# Patient Record
Sex: FEMALE | Race: OTHER | URBAN - METROPOLITAN AREA
[De-identification: names, ages, dates, MRNs, and addresses within clinical notes are randomized per-mention and may not be internally consistent; named-entity substitution may affect disease eponyms.]

---

## 2020-01-28 ENCOUNTER — IMPORTED ENCOUNTER (OUTPATIENT)
Dept: URBAN - METROPOLITAN AREA CLINIC 50 | Facility: CLINIC | Age: 70
End: 2020-01-28

## 2020-06-01 ENCOUNTER — IMPORTED ENCOUNTER (OUTPATIENT)
Dept: URBAN - METROPOLITAN AREA CLINIC 50 | Facility: CLINIC | Age: 70
End: 2020-06-01

## 2020-06-04 ENCOUNTER — IMPORTED ENCOUNTER (OUTPATIENT)
Dept: URBAN - METROPOLITAN AREA CLINIC 50 | Facility: CLINIC | Age: 70
End: 2020-06-04

## 2020-08-18 ENCOUNTER — IMPORTED ENCOUNTER (OUTPATIENT)
Dept: URBAN - METROPOLITAN AREA CLINIC 50 | Facility: CLINIC | Age: 70
End: 2020-08-18

## 2020-09-11 ENCOUNTER — IMPORTED ENCOUNTER (OUTPATIENT)
Dept: URBAN - METROPOLITAN AREA CLINIC 50 | Facility: CLINIC | Age: 70
End: 2020-09-11

## 2020-09-11 NOTE — PATIENT DISCUSSION
"""Phaco with IOL OS: 09/17/2020 Ashley ZCB00 22.0 ([facility]) Target: [facility] [place]

## 2020-09-11 NOTE — PATIENT DISCUSSION
"""Discussion on dry eye and the importance of continuing protocol.  Patient states she's been on ""

## 2020-09-14 ENCOUNTER — IMPORTED ENCOUNTER (OUTPATIENT)
Dept: URBAN - METROPOLITAN AREA CLINIC 50 | Facility: CLINIC | Age: 70
End: 2020-09-14

## 2020-10-01 ENCOUNTER — IMPORTED ENCOUNTER (OUTPATIENT)
Dept: URBAN - METROPOLITAN AREA CLINIC 50 | Facility: CLINIC | Age: 70
End: 2020-10-01

## 2020-10-01 NOTE — PATIENT DISCUSSION
"""S/P IOL OS: Tecnis ZCB00 22.0 ([facility]) (Target: [place]) +[facility]/Femto/Arcs +Omidria. Continue post operative instructions and drops per schedule.  """

## 2020-10-09 ENCOUNTER — IMPORTED ENCOUNTER (OUTPATIENT)
Dept: URBAN - METROPOLITAN AREA CLINIC 50 | Facility: CLINIC | Age: 70
End: 2020-10-09

## 2020-10-15 ENCOUNTER — IMPORTED ENCOUNTER (OUTPATIENT)
Dept: URBAN - METROPOLITAN AREA CLINIC 50 | Facility: CLINIC | Age: 70
End: 2020-10-15

## 2020-10-15 NOTE — PATIENT DISCUSSION
"""S/P IOL OD: Tecnis ZCB00 21.5 (Target: [place]) +Femto/Arcs +Omidria. Continue post operative instructions and drops per schedule.  """

## 2020-10-22 ENCOUNTER — IMPORTED ENCOUNTER (OUTPATIENT)
Dept: URBAN - METROPOLITAN AREA CLINIC 50 | Facility: CLINIC | Age: 70
End: 2020-10-22

## 2020-10-22 NOTE — PATIENT DISCUSSION
""""
"""S/P IOL OD: Tecnis ZCB00 21.5 (Target: [place]) +Femto/Arcs +Omidria.  Continue post operative ""
normal...

## 2020-11-20 ENCOUNTER — IMPORTED ENCOUNTER (OUTPATIENT)
Dept: URBAN - METROPOLITAN AREA CLINIC 50 | Facility: CLINIC | Age: 70
End: 2020-11-20

## 2020-11-20 NOTE — PATIENT DISCUSSION
"""S/P IOL OU: OD: Tecnis ZCB00 21.5 (Target: [place])Femto/ArcsOmidria.  OS: Tecnis ZCB00 22.0 ""

## 2021-03-03 ENCOUNTER — IMPORTED ENCOUNTER (OUTPATIENT)
Dept: URBAN - METROPOLITAN AREA CLINIC 50 | Facility: CLINIC | Age: 71
End: 2021-03-03

## 2021-04-01 ENCOUNTER — IMPORTED ENCOUNTER (OUTPATIENT)
Dept: URBAN - METROPOLITAN AREA CLINIC 50 | Facility: CLINIC | Age: 71
End: 2021-04-01

## 2021-04-17 ASSESSMENT — VISUAL ACUITY
OD_PH: 20/30+2
OS_BAT: 20/30
OS_SC: 20/40+1
OS_OTHER: 20/40. 20/40.
OS_CC: J1+
OS_OTHER: 20/40. 20/60.
OD_OTHER: 20/50. 20/80.
OD_SC: 20/50-2
OD_SC: 20/70
OS_SC: 20/40
OS_SC: 20/25-1
OS_BAT: 20/40
OD_CC: J1+
OD_OTHER: 20/30. 20/40.
OS_BAT: 20/40
OD_CC: J1+
OD_SC: 20/40+1
OS_CC: 20/30+2
OD_OTHER: 20/50. 20/80.
OD_SC: 20/25+
OS_SC: 20/25-
OD_BAT: 20/50
OS_SC: 20/20-2
OS_PH: 20/30
OS_CC: J1+@ 16 IN
OD_BAT: 20/50
OD_BAT: 20/50
OD_CC: J1+@ 16 IN
OD_PH: 20/25
OS_OTHER: 20/40. 20/60.
OS_CC: J1+
OD_OTHER: 20/50. 20/80.
OD_SC: 20/25
OS_SC: 20/25-1
OD_SC: 20/50
OD_SC: 20/50+
OD_BAT: 20/30
OS_OTHER: 20/30. 20/40.
OS_BAT: 20/40

## 2021-04-17 ASSESSMENT — TONOMETRY
OS_IOP_MMHG: 16
OS_IOP_MMHG: 10
OS_IOP_MMHG: 12
OD_IOP_MMHG: 16
OD_IOP_MMHG: 11
OD_IOP_MMHG: 12
OS_IOP_MMHG: 11
OS_IOP_MMHG: 12
OD_IOP_MMHG: 13
OD_IOP_MMHG: 16
OD_IOP_MMHG: 12
OD_IOP_MMHG: 12
OS_IOP_MMHG: 14
OS_IOP_MMHG: 11

## 2022-02-24 ENCOUNTER — PREPPED CHART (OUTPATIENT)
Dept: URBAN - METROPOLITAN AREA CLINIC 48 | Facility: CLINIC | Age: 72
End: 2022-02-24

## 2022-03-02 ENCOUNTER — COMPREHENSIVE EXAM (OUTPATIENT)
Dept: URBAN - METROPOLITAN AREA CLINIC 48 | Facility: CLINIC | Age: 72
End: 2022-03-02

## 2022-03-02 DIAGNOSIS — H04.123: ICD-10-CM

## 2022-03-02 DIAGNOSIS — H43.813: ICD-10-CM

## 2022-03-02 DIAGNOSIS — H26.493: ICD-10-CM

## 2022-03-02 PROCEDURE — 92014 COMPRE OPH EXAM EST PT 1/>: CPT

## 2022-03-02 ASSESSMENT — VISUAL ACUITY
OS_SC: 20/20-1
OD_GLARE: 20/20
OU_SC: J1
OS_GLARE: 20/40
OD_SC: 20/20-1
OS_GLARE: 20/20
OD_GLARE: 20/20

## 2022-03-02 ASSESSMENT — TONOMETRY
OS_IOP_MMHG: 11
OD_IOP_MMHG: 11